# Patient Record
(demographics unavailable — no encounter records)

---

## 2024-11-15 NOTE — HISTORY OF PRESENT ILLNESS
[FreeTextEntry1] : 27-year-old male who presents for a postoperative visit after undergoing incision and drainage of perianal abscess and seton placement for complex fistulizing left perianal abscess.  He has been doing very well but about a week ago developed another abscess in the left side with drained spontaneously.

## 2024-11-15 NOTE — PHYSICAL EXAM
[Respiratory Effort] : normal respiratory effort [Calm] : calm [de-identified] : Left perianal fistulas with 2 setons in place.  New external opening in the superior buttock area draining pus.  Persistent induration in the left perianal region. [de-identified] : Well-appearing, in no distress [de-identified] : Normocephalic, atraumatic [de-identified] : Moves extremities without difficulty [de-identified] : Warm and dry [de-identified] : Alert and oriented x 3

## 2024-11-15 NOTE — PLAN
[TextEntry] : 27-year-old male with a complex transfer enteric left anal fistula status post seton placement.  There was 1 main internal opening of the fistula with multiple branching external openings, two of which now contain setons.  Oddly, he developed another perianal abscess that is now drained spontaneously and does not require antibiotics or further incision and drainage.  Will send some pain medicines to help with the discomfort from this area.  Will allow more time for the induration to improve and then will schedule the next step of surgery which will likely be a LIFT procedure.  Follow-up in 3 to 4 weeks

## 2025-01-31 NOTE — PHYSICAL EXAM
[Respiratory Effort] : normal respiratory effort [Calm] : calm [de-identified] : Left perianal fistulas with 2 setons in place.  Stable external opening in the superior buttock area with minimal drainage.  There is some improvement in induration in the left perianal region. [de-identified] : Normocephalic, atraumatic [de-identified] : Well-appearing, in no distress [de-identified] : Moves extremities without difficulty [de-identified] : Warm and dry [de-identified] : Alert and oriented x 3

## 2025-01-31 NOTE — HISTORY OF PRESENT ILLNESS
[FreeTextEntry1] : 27-year-old male who presents for a follow-up visit after undergoing incision and drainage of perianal abscess and seton placement for complex fistulizing left perianal abscess in October.  He is doing well although still has other draining sinuses in the perianal region and discomfort associated to it.  No fevers or chills.  He has no other GI symptoms but has never had a colonoscopy to rule out Crohn's disease.

## 2025-01-31 NOTE — PLAN
[TextEntry] : 27-year-old male with a complex left perianal transphincteric fistula with multiple external openings.  I recommend a colonoscopy to ensure there is no obvious findings of Crohn's disease.  After this, I recommend proceeding with repair of anal fistula and ligation of intersphincteric tract. Risks and benefits of surgery were reviewed extensively. Risks include but are limited to cardiopulmonary complications from anesthesia, risk of bleeding, infection,abscess, fistula, injury to perianal nerves and muscles with resulting in incontinence

## 2025-03-21 NOTE — HISTORY OF PRESENT ILLNESS
[FreeTextEntry1] : 27-year-old male who presents for a postoperative visit after undergoing the ligation of intersphincteric tract and repair of complex anal fistula.  He has been doing well since surgery.  Denies any rectal pain.  He is having regular bowel movements without difficulty.

## 2025-03-21 NOTE — PLAN
[TextEntry] : 27-year-old male status post ligation of intersphincteric tract for complex anal fistula.  Progressing well.  Penrose drain was removed.  Follow-up in 2 weeks.

## 2025-03-21 NOTE — PHYSICAL EXAM
[Respiratory Effort] : normal respiratory effort [Calm] : calm [de-identified] : External opening from fistula sites are healing appropriately.  Perianal surgical wound is also healing appropriately, Penrose was removed.  No sign of infection.   [de-identified] : Well-appearing, in no distress [de-identified] : Normocephalic, atraumatic [de-identified] : Moves extremities without difficulty [de-identified] : Warm and dry [de-identified] : Alert and oriented x 3

## 2025-03-28 NOTE — HISTORY OF PRESENT ILLNESS
[FreeTextEntry1] : 27M w no PMH presents for primary infertility.  Early-2024, he and his girlfriend started trying to conceive w/o using ovulation tracking.  Today, he is doing well overall. He denies ED or ejaculatory dysfunction. His girlfriend is 26 years old and has a 6-year-old child from a prior relationship.

## 2025-03-28 NOTE — ASSESSMENT
[FreeTextEntry1] : 27M w no PMH presents for primary infertility. He warrants a semen analysis. I also recommended that his girlfriend begin using ovulation tracking for timed intercourse.  -Ovulation tracking -Semen analysis -RTC 2M

## 2025-04-16 NOTE — PHYSICAL EXAM
[Respiratory Effort] : normal respiratory effort [Calm] : calm [de-identified] : External opening from fistula sites are healing appropriately.  Perianal incision with a sinus draining pus.  No surrounding erythema or fluctuance in the area.  [de-identified] : Well-appearing, in no distress [de-identified] : Normocephalic, atraumatic [de-identified] : Moves extremities without difficulty [de-identified] : Warm and dry [de-identified] : Alert and oriented x 3

## 2025-04-16 NOTE — PLAN
[TextEntry] : 28-year-old male with complex perianal fistula status post ligation of intersphincteric tract.  The surgical incision has mostly healed but there is a small open sinus at the perianal incision that I was able to express some pus.  He has no pain or symptoms in the area.  I would like to follow this in a month or two to ensure it heals completely without persistent or recurrence of the fistula at this site.

## 2025-04-16 NOTE — HISTORY OF PRESENT ILLNESS
[FreeTextEntry1] : 28-year-old male who presents for a postoperative visit after undergoing the ligation of intersphincteric tract and repair of complex anal fistula.  He has been doing well since surgery.  Denies any rectal pain.  He is having regular bowel movements without difficulty.

## 2025-05-21 NOTE — PHYSICAL EXAM
[Respiratory Effort] : normal respiratory effort [Calm] : calm [de-identified] : Perianal incision created for left procedure has purulent drainage with no associated fluctuance.  There is an area of prior active fistula that has fluctuating and well palpated, it drains at the perianal incision site.  [de-identified] : Well-appearing, in no distress [de-identified] : Normocephalic, atraumatic [de-identified] : Moves extremities without difficulty [de-identified] : Warm and dry [de-identified] : Alert and oriented x 3

## 2025-05-21 NOTE — HISTORY OF PRESENT ILLNESS
[FreeTextEntry1] : 28-year-old male who presents for a follow-up visit after undergoing ligation of intersphincteric tract and repair of complex anal fistula in March.  He initially did well from surgery and had no pain or drainage.  He returns now with accumulation of pus and discharge from the surgical site.  No associated fevers or chills.  He is having regular bowel movements without difficulty.

## 2025-05-21 NOTE — PLAN
[TextEntry] : 28-year-old male with a complex perianal fistula who has undergone ligation of intersphincteric tract who has recurrent fistula with ongoing drainage.  Will treat with a course of antibiotics and a recommend return to the OR for placement of a seton at the site followed by staged repair of the fistula.  Risks and benefits of surgery was reviewed and we will proceed with scheduling surgery.

## 2025-06-03 NOTE — HISTORY OF PRESENT ILLNESS
[FreeTextEntry1] : 28M w no PMH presents for primary infertility.  Early-2024, he and his girlfriend started trying to conceive w/o using ovulation tracking.  5/15/25, semen analysis: 2.7 ml, 11.4 million/ml, 36% motility, 0% morphology. TMC: 11.2 million.  Today, he is doing well overall. He denies ED or ejaculatory dysfunction. His girlfriend is 26 years old and has a 6-year-old child from a prior relationship.

## 2025-06-03 NOTE — ASSESSMENT
[FreeTextEntry1] : 28M w no PMH presents for primary infertility. He has idiopathic oligospermia. We agreed to repeat the semen analysis and perform endocrine testing. In the meantime, he will start discussing IUI w his partner.  -Repeat semen analysis -Testosterone labs -GYN referral for IUI -RTC July